# Patient Record
Sex: MALE | Race: WHITE | URBAN - METROPOLITAN AREA
[De-identification: names, ages, dates, MRNs, and addresses within clinical notes are randomized per-mention and may not be internally consistent; named-entity substitution may affect disease eponyms.]

---

## 2017-01-06 ENCOUNTER — INPATIENT (INPATIENT)
Facility: HOSPITAL | Age: 32
LOS: 1 days | Discharge: HOME | DRG: 343 | End: 2017-01-08
Attending: SURGERY | Admitting: SURGERY
Payer: COMMERCIAL

## 2017-01-06 VITALS
OXYGEN SATURATION: 100 % | TEMPERATURE: 98 F | SYSTOLIC BLOOD PRESSURE: 125 MMHG | DIASTOLIC BLOOD PRESSURE: 70 MMHG | HEART RATE: 43 BPM | RESPIRATION RATE: 17 BRPM

## 2017-01-06 LAB
APPEARANCE UR: CLEAR — SIGNIFICANT CHANGE UP
BILIRUB UR-MCNC: NEGATIVE — SIGNIFICANT CHANGE UP
COLOR SPEC: YELLOW — SIGNIFICANT CHANGE UP
DIFF PNL FLD: NEGATIVE — SIGNIFICANT CHANGE UP
GLUCOSE UR QL: NEGATIVE — SIGNIFICANT CHANGE UP
KETONES UR-MCNC: >=80 MG/DL
LEUKOCYTE ESTERASE UR-ACNC: NEGATIVE — SIGNIFICANT CHANGE UP
LIDOCAIN IGE QN: 77 U/L — SIGNIFICANT CHANGE UP (ref 73–393)
NITRITE UR-MCNC: NEGATIVE — SIGNIFICANT CHANGE UP
PH UR: 8.5 — HIGH (ref 4–8.1)
PROT UR-MCNC: (no result) MG/DL
SP GR SPEC: 1.02 — SIGNIFICANT CHANGE UP (ref 1–1.03)
UROBILINOGEN FLD QL: 0.2 E.U./DL — SIGNIFICANT CHANGE UP

## 2017-01-06 PROCEDURE — 74177 CT ABD & PELVIS W/CONTRAST: CPT | Mod: 26

## 2017-01-06 PROCEDURE — 76705 ECHO EXAM OF ABDOMEN: CPT | Mod: 26,59

## 2017-01-06 PROCEDURE — 93976 VASCULAR STUDY: CPT | Mod: 26

## 2017-01-06 PROCEDURE — 99285 EMERGENCY DEPT VISIT HI MDM: CPT

## 2017-01-06 RX ORDER — SODIUM CHLORIDE 9 MG/ML
2000 INJECTION INTRAMUSCULAR; INTRAVENOUS; SUBCUTANEOUS ONCE
Qty: 0 | Refills: 0 | Status: COMPLETED | OUTPATIENT
Start: 2017-01-06 | End: 2017-01-06

## 2017-01-06 RX ORDER — MORPHINE SULFATE 50 MG/1
4 CAPSULE, EXTENDED RELEASE ORAL ONCE
Qty: 0 | Refills: 0 | Status: DISCONTINUED | OUTPATIENT
Start: 2017-01-06 | End: 2017-01-06

## 2017-01-06 RX ORDER — ONDANSETRON 8 MG/1
4 TABLET, FILM COATED ORAL EVERY 6 HOURS
Qty: 0 | Refills: 0 | Status: DISCONTINUED | OUTPATIENT
Start: 2017-01-06 | End: 2017-01-07

## 2017-01-06 RX ORDER — PIPERACILLIN AND TAZOBACTAM 4; .5 G/20ML; G/20ML
3.38 INJECTION, POWDER, LYOPHILIZED, FOR SOLUTION INTRAVENOUS ONCE
Qty: 0 | Refills: 0 | Status: COMPLETED | OUTPATIENT
Start: 2017-01-06 | End: 2017-01-06

## 2017-01-06 RX ORDER — HYDROMORPHONE HYDROCHLORIDE 2 MG/ML
1 INJECTION INTRAMUSCULAR; INTRAVENOUS; SUBCUTANEOUS EVERY 4 HOURS
Qty: 0 | Refills: 0 | Status: DISCONTINUED | OUTPATIENT
Start: 2017-01-06 | End: 2017-01-07

## 2017-01-06 RX ORDER — HEPARIN SODIUM 5000 [USP'U]/ML
5000 INJECTION INTRAVENOUS; SUBCUTANEOUS EVERY 8 HOURS
Qty: 0 | Refills: 0 | Status: DISCONTINUED | OUTPATIENT
Start: 2017-01-06 | End: 2017-01-08

## 2017-01-06 RX ORDER — FAMOTIDINE 10 MG/ML
20 INJECTION INTRAVENOUS ONCE
Qty: 0 | Refills: 0 | Status: COMPLETED | OUTPATIENT
Start: 2017-01-06 | End: 2017-01-06

## 2017-01-06 RX ORDER — ONDANSETRON 8 MG/1
8 TABLET, FILM COATED ORAL ONCE
Qty: 0 | Refills: 0 | Status: COMPLETED | OUTPATIENT
Start: 2017-01-06 | End: 2017-01-06

## 2017-01-06 RX ORDER — SODIUM CHLORIDE 9 MG/ML
1000 INJECTION, SOLUTION INTRAVENOUS
Qty: 0 | Refills: 0 | Status: DISCONTINUED | OUTPATIENT
Start: 2017-01-06 | End: 2017-01-07

## 2017-01-06 RX ORDER — PIPERACILLIN AND TAZOBACTAM 4; .5 G/20ML; G/20ML
3.38 INJECTION, POWDER, LYOPHILIZED, FOR SOLUTION INTRAVENOUS EVERY 6 HOURS
Qty: 0 | Refills: 0 | Status: DISCONTINUED | OUTPATIENT
Start: 2017-01-06 | End: 2017-01-07

## 2017-01-06 RX ORDER — METOCLOPRAMIDE HCL 10 MG
10 TABLET ORAL ONCE
Qty: 0 | Refills: 0 | Status: COMPLETED | OUTPATIENT
Start: 2017-01-06 | End: 2017-01-06

## 2017-01-06 RX ORDER — HYDROMORPHONE HYDROCHLORIDE 2 MG/ML
0.5 INJECTION INTRAMUSCULAR; INTRAVENOUS; SUBCUTANEOUS EVERY 4 HOURS
Qty: 0 | Refills: 0 | Status: DISCONTINUED | OUTPATIENT
Start: 2017-01-06 | End: 2017-01-07

## 2017-01-06 RX ORDER — SODIUM CHLORIDE 9 MG/ML
1000 INJECTION INTRAMUSCULAR; INTRAVENOUS; SUBCUTANEOUS
Qty: 0 | Refills: 0 | Status: DISCONTINUED | OUTPATIENT
Start: 2017-01-06 | End: 2017-01-06

## 2017-01-06 RX ORDER — IOHEXOL 300 MG/ML
50 INJECTION, SOLUTION INTRAVENOUS ONCE
Qty: 0 | Refills: 0 | Status: COMPLETED | OUTPATIENT
Start: 2017-01-06 | End: 2017-01-06

## 2017-01-06 RX ADMIN — MORPHINE SULFATE 4 MILLIGRAM(S): 50 CAPSULE, EXTENDED RELEASE ORAL at 17:54

## 2017-01-06 RX ADMIN — SODIUM CHLORIDE 2000 MILLILITER(S): 9 INJECTION INTRAMUSCULAR; INTRAVENOUS; SUBCUTANEOUS at 15:54

## 2017-01-06 RX ADMIN — MORPHINE SULFATE 4 MILLIGRAM(S): 50 CAPSULE, EXTENDED RELEASE ORAL at 20:02

## 2017-01-06 RX ADMIN — MORPHINE SULFATE 4 MILLIGRAM(S): 50 CAPSULE, EXTENDED RELEASE ORAL at 16:15

## 2017-01-06 RX ADMIN — Medication 10 MILLIGRAM(S): at 19:16

## 2017-01-06 RX ADMIN — MORPHINE SULFATE 4 MILLIGRAM(S): 50 CAPSULE, EXTENDED RELEASE ORAL at 17:53

## 2017-01-06 RX ADMIN — FAMOTIDINE 20 MILLIGRAM(S): 10 INJECTION INTRAVENOUS at 16:15

## 2017-01-06 RX ADMIN — IOHEXOL 50 MILLILITER(S): 300 INJECTION, SOLUTION INTRAVENOUS at 16:38

## 2017-01-06 RX ADMIN — ONDANSETRON 8 MILLIGRAM(S): 8 TABLET, FILM COATED ORAL at 16:15

## 2017-01-06 RX ADMIN — PIPERACILLIN AND TAZOBACTAM 200 GRAM(S): 4; .5 INJECTION, POWDER, LYOPHILIZED, FOR SOLUTION INTRAVENOUS at 20:18

## 2017-01-06 RX ADMIN — MORPHINE SULFATE 4 MILLIGRAM(S): 50 CAPSULE, EXTENDED RELEASE ORAL at 20:19

## 2017-01-06 NOTE — ED PROVIDER NOTE - OBJECTIVE STATEMENT
32 yo M with no allergies, and hx of orthopedic surgeries presents with acute onset of N/V/D with abd pain today. 1 episode of diarrhea. Admits to chills. Denies fever and sweats. Pt is from [place], here for honeymoon. No sick contact. Denies generalized weakness. body aches, dizziness, and syncope. 30 yo M with no allergies, and hx of orthopedic surgeries presents with acute onset of N/V/D with abd pain today. 1 episode of diarrhea. Admits to chills. Denies fever and sweats. Pt is from [place], here for [place]. No sick contact. Denies generalized weakness. body aches, dizziness, and syncope. patient arrived from [place] 2 days ago for his honeymoon.

## 2017-01-06 NOTE — ED PROVIDER NOTE - MEDICAL DECISION MAKING DETAILS
The scribe's documentation has been prepared under my direction and personally reviewed by me in its entirety. I confirm that the note above accurately reflects all work, treatment, procedures, and medical decision making performed by me. - АННА Gandhi Pt with acute onset of abd pain and N/V. Will do labs, IV hydration and CT. re-assess.   The scribe's documentation has been prepared under my direction and personally reviewed by me in its entirety. I confirm that the note above accurately reflects all work, treatment, procedures, and medical decision making performed by me. - АННА Gandhi

## 2017-01-06 NOTE — ED PROVIDER NOTE - NS ED MD SCRIBE ATTENDING SCRIBE SECTIONS
DISPOSITION/PHYSICAL EXAM/VITAL SIGNS( Pullset)/PAST MEDICAL/SURGICAL/SOCIAL HISTORY/HIV/HISTORY OF PRESENT ILLNESS/REVIEW OF SYSTEMS

## 2017-01-07 DIAGNOSIS — Z98.890 OTHER SPECIFIED POSTPROCEDURAL STATES: Chronic | ICD-10-CM

## 2017-01-07 DIAGNOSIS — K37 UNSPECIFIED APPENDICITIS: ICD-10-CM

## 2017-01-07 LAB
ANION GAP SERPL CALC-SCNC: 8 MMOL/L — LOW (ref 9–16)
BLD GP AB SCN SERPL QL: NEGATIVE — SIGNIFICANT CHANGE UP
BLD GP AB SCN SERPL QL: NEGATIVE — SIGNIFICANT CHANGE UP
BUN SERPL-MCNC: 16 MG/DL — SIGNIFICANT CHANGE UP (ref 7–23)
CALCIUM SERPL-MCNC: 8.4 MG/DL — LOW (ref 8.5–10.5)
CHLORIDE SERPL-SCNC: 102 MMOL/L — SIGNIFICANT CHANGE UP (ref 96–108)
CO2 SERPL-SCNC: 27 MMOL/L — SIGNIFICANT CHANGE UP (ref 22–31)
CREAT SERPL-MCNC: 1.22 MG/DL — SIGNIFICANT CHANGE UP (ref 0.5–1.3)
GLUCOSE SERPL-MCNC: 83 MG/DL — SIGNIFICANT CHANGE UP (ref 70–99)
HCT VFR BLD CALC: 38.7 % — LOW (ref 39–50)
HGB BLD-MCNC: 13.4 G/DL — SIGNIFICANT CHANGE UP (ref 13–17)
MAGNESIUM SERPL-MCNC: 2.1 MG/DL — SIGNIFICANT CHANGE UP (ref 1.6–2.4)
MCHC RBC-ENTMCNC: 29.5 PG — SIGNIFICANT CHANGE UP (ref 27–34)
MCHC RBC-ENTMCNC: 34.6 G/DL — SIGNIFICANT CHANGE UP (ref 32–36)
MCV RBC AUTO: 85.1 FL — SIGNIFICANT CHANGE UP (ref 80–100)
PHOSPHATE SERPL-MCNC: 2.9 MG/DL — SIGNIFICANT CHANGE UP (ref 2.5–4.5)
PLATELET # BLD AUTO: 175 K/UL — SIGNIFICANT CHANGE UP (ref 150–400)
POTASSIUM SERPL-MCNC: 3.7 MMOL/L — SIGNIFICANT CHANGE UP (ref 3.5–5.3)
POTASSIUM SERPL-SCNC: 3.7 MMOL/L — SIGNIFICANT CHANGE UP (ref 3.5–5.3)
RBC # BLD: 4.55 M/UL — SIGNIFICANT CHANGE UP (ref 4.2–5.8)
RBC # FLD: 11.2 % — SIGNIFICANT CHANGE UP (ref 10.3–16.9)
RH IG SCN BLD-IMP: POSITIVE — SIGNIFICANT CHANGE UP
RH IG SCN BLD-IMP: POSITIVE — SIGNIFICANT CHANGE UP
SODIUM SERPL-SCNC: 137 MMOL/L — SIGNIFICANT CHANGE UP (ref 135–145)
WBC # BLD: 10 K/UL — SIGNIFICANT CHANGE UP (ref 3.8–10.5)
WBC # FLD AUTO: 10 K/UL — SIGNIFICANT CHANGE UP (ref 3.8–10.5)

## 2017-01-07 RX ORDER — PIPERACILLIN AND TAZOBACTAM 4; .5 G/20ML; G/20ML
3.38 INJECTION, POWDER, LYOPHILIZED, FOR SOLUTION INTRAVENOUS EVERY 6 HOURS
Qty: 0 | Refills: 0 | Status: DISCONTINUED | OUTPATIENT
Start: 2017-01-07 | End: 2017-01-07

## 2017-01-07 RX ORDER — HYDROMORPHONE HYDROCHLORIDE 2 MG/ML
1 INJECTION INTRAMUSCULAR; INTRAVENOUS; SUBCUTANEOUS
Qty: 0 | Refills: 0 | Status: DISCONTINUED | OUTPATIENT
Start: 2017-01-07 | End: 2017-01-08

## 2017-01-07 RX ORDER — ONDANSETRON 8 MG/1
4 TABLET, FILM COATED ORAL EVERY 6 HOURS
Qty: 0 | Refills: 0 | Status: DISCONTINUED | OUTPATIENT
Start: 2017-01-07 | End: 2017-01-08

## 2017-01-07 RX ORDER — SODIUM CHLORIDE 9 MG/ML
1000 INJECTION, SOLUTION INTRAVENOUS
Qty: 0 | Refills: 0 | Status: DISCONTINUED | OUTPATIENT
Start: 2017-01-07 | End: 2017-01-08

## 2017-01-07 RX ORDER — PIPERACILLIN AND TAZOBACTAM 4; .5 G/20ML; G/20ML
3.38 INJECTION, POWDER, LYOPHILIZED, FOR SOLUTION INTRAVENOUS EVERY 8 HOURS
Qty: 0 | Refills: 0 | Status: DISCONTINUED | OUTPATIENT
Start: 2017-01-07 | End: 2017-01-08

## 2017-01-07 RX ORDER — HYDROMORPHONE HYDROCHLORIDE 2 MG/ML
0.5 INJECTION INTRAMUSCULAR; INTRAVENOUS; SUBCUTANEOUS ONCE
Qty: 0 | Refills: 0 | Status: DISCONTINUED | OUTPATIENT
Start: 2017-01-07 | End: 2017-01-08

## 2017-01-07 RX ORDER — HYDROMORPHONE HYDROCHLORIDE 2 MG/ML
1 INJECTION INTRAMUSCULAR; INTRAVENOUS; SUBCUTANEOUS
Qty: 0 | Refills: 0 | Status: DISCONTINUED | OUTPATIENT
Start: 2017-01-07 | End: 2017-01-07

## 2017-01-07 RX ADMIN — HYDROMORPHONE HYDROCHLORIDE 1 MILLIGRAM(S): 2 INJECTION INTRAMUSCULAR; INTRAVENOUS; SUBCUTANEOUS at 12:29

## 2017-01-07 RX ADMIN — ONDANSETRON 4 MILLIGRAM(S): 8 TABLET, FILM COATED ORAL at 21:10

## 2017-01-07 RX ADMIN — HEPARIN SODIUM 5000 UNIT(S): 5000 INJECTION INTRAVENOUS; SUBCUTANEOUS at 14:18

## 2017-01-07 RX ADMIN — PIPERACILLIN AND TAZOBACTAM 200 GRAM(S): 4; .5 INJECTION, POWDER, LYOPHILIZED, FOR SOLUTION INTRAVENOUS at 14:18

## 2017-01-07 RX ADMIN — PIPERACILLIN AND TAZOBACTAM 200 GRAM(S): 4; .5 INJECTION, POWDER, LYOPHILIZED, FOR SOLUTION INTRAVENOUS at 21:43

## 2017-01-07 RX ADMIN — PIPERACILLIN AND TAZOBACTAM 200 GRAM(S): 4; .5 INJECTION, POWDER, LYOPHILIZED, FOR SOLUTION INTRAVENOUS at 05:26

## 2017-01-07 RX ADMIN — HEPARIN SODIUM 5000 UNIT(S): 5000 INJECTION INTRAVENOUS; SUBCUTANEOUS at 21:10

## 2017-01-07 RX ADMIN — PIPERACILLIN AND TAZOBACTAM 200 GRAM(S): 4; .5 INJECTION, POWDER, LYOPHILIZED, FOR SOLUTION INTRAVENOUS at 00:09

## 2017-01-07 NOTE — DISCHARGE NOTE ADULT - MEDICATION SUMMARY - MEDICATIONS TO TAKE
I will START or STAY ON the medications listed below when I get home from the hospital:    acetaminophen-oxyCODONE 325 mg-5 mg oral tablet  -- 1 tab(s) by mouth every 4 hours, As needed, Moderate Pain (4 - 6) MDD:6  -- Indication: For pain I will START or STAY ON the medications listed below when I get home from the hospital:    acetaminophen-oxyCODONE 325 mg-5 mg oral tablet  -- 1 tab(s) by mouth every 4 hours, As needed, Moderate Pain (4 - 6) MDD:6  -- Indication: For pain    Colace sodium 100 mg oral capsule  -- 1 cap(s) by mouth 2 times a day  -- Medication should be taken with plenty of water.    -- Indication: For stool softener

## 2017-01-07 NOTE — PROGRESS NOTE ADULT - SUBJECTIVE AND OBJECTIVE BOX
PRE OPERATIVE NOTE    Pre-op Diagnosis: acute appendicitis  Procedure: laparoscopic appendectomy   Surgeon: Dr. Bishop    Consent in chart                          15.5   16.8  )-----------( 218      ( 2017 15:52 )             43.0     2017 15:52    136    |  99     |  24     ----------------------------<  117    3.6     |  26     |  1.44     Ca    10.2       2017 15:52    TPro  7.9    /  Alb  4.3    /  TBili  0.9    /  DBili  x      /  AST  22     /  ALT  15     /  AlkPhos  71     2017 15:52      Urinalysis Basic - ( 2017 18:36 )    Color: Yellow / Appearance: Clear / S.020 / pH: x  Gluc: x / Ketone: >=80 mg/dL  / Bili: NEGATIVE / Urobili: 0.2 E.U./dL   Blood: x / Protein: Trace mg/dL / Nitrite: NEGATIVE   Leuk Esterase: NEGATIVE / RBC: < 5 /HPF / WBC < 5 /HPF   Sq Epi: x / Non Sq Epi: Rare /HPF / Bacteria: x        Type & Screen: done PRE OPERATIVE NOTE    Pre-op Diagnosis: acute appendicitis  Procedure: laparoscopic appendectomy   Surgeon: Dr. Bishop    Consent in chart    Pt. seen and examined at beside. c/o tenderness RLQ.     PE:    NAD  RRR s mg/r/ +S1/S2  CTA B  TTP RLQ (-) rebound, (-) guarding, (+) BS  2+ pulses distally                          15.5   16.8  )-----------( 218      ( 2017 15:52 )             43.0     2017 15:52    136    |  99     |  24     ----------------------------<  117    3.6     |  26     |  1.44     Ca    10.2       2017 15:52    TPro  7.9    /  Alb  4.3    /  TBili  0.9    /  DBili  x      /  AST  22     /  ALT  15     /  AlkPhos  71     2017 15:52      Urinalysis Basic - ( 2017 18:36 )    Color: Yellow / Appearance: Clear / S.020 / pH: x  Gluc: x / Ketone: >=80 mg/dL  / Bili: NEGATIVE / Urobili: 0.2 E.U./dL   Blood: x / Protein: Trace mg/dL / Nitrite: NEGATIVE   Leuk Esterase: NEGATIVE / RBC: < 5 /HPF / WBC < 5 /HPF   Sq Epi: x / Non Sq Epi: Rare /HPF / Bacteria: x        Type & Screen: done

## 2017-01-07 NOTE — DISCHARGE NOTE ADULT - ADDITIONAL INSTRUCTIONS
Please follow-up with Dr. Bishop in 1 week.   Call [phone] to make an appointment. Resume regular activity as tolerated.  No strenuous activity or heavy lifting >15 pounds until cleared by Dr. Bishop.  Resume regular diet.  May shower, but no baths or soaking for 2 weeks.  Return to ER with Fever >101, bleeding/drainage from incisions, or severe abdominal pain not relieved by pain medication.

## 2017-01-07 NOTE — DISCHARGE NOTE ADULT - HOSPITAL COURSE
30 yo otherwise healthy male presented to [facility] with periumbilical abdominal pain since morning of presentation. Associated with nausea and NB vomiting. Denies fever, chills, SOB or changes in bowel habits. CT A/P consistent with acute appendicitis. No previous abdominal surgeries. Non-smoker and social drinker. Patient currently on his honeymoon and had originally planned to return to [place] on 1/8. Patient was subsequently taken to the operating room where he underwent a laparoscopic appendectomy. He tolerated the procedure well without complication. His post-operative course was unremarkable. At the time of discharge, he was tolerating a regular diet, his pain was well controlled with oral pain medications, and he was ambulating independently. He was discharged home with instructions to follow up with Dr. Bishop in 1 week.

## 2017-01-07 NOTE — DISCHARGE NOTE ADULT - CARE PROVIDER_API CALL
Kobe Bishop), Surgery  [address] [place] [address]  Phone: [phone]  Fax: [phone]

## 2017-01-07 NOTE — DISCHARGE NOTE ADULT - INSTRUCTIONS
Please follow-up with Dr. Bishop in 1 week.   Call [phone] to make an appointment. Resume regular activity as tolerated.  No strenuous activity or heavy lifting >15 pounds until cleared by Dr. Bishop.  Resume regular diet.  May shower, but no baths or soaking for 2 weeks.  Return to ER with Fever >101, bleeding/drainage from incisions, or severe abdominal pain not relieved by pain medication.

## 2017-01-07 NOTE — PROGRESS NOTE ADULT - ASSESSMENT
A/P: 31yMale planned for above procedure    1. NPO past midnight, except medications  2. IVF lactated ringers. 1000milliLiter(s) IV Continuous <Continuous>    3. [ ] Blood on hold, Units: A/P: 31yMale planned for above procedure    1. NPO past midnight, except medications  2. IVF   3. Zosyn  4. Pain/nausea control  5. DVT ppx  6. OR for laparoscopic appendectomy

## 2017-01-07 NOTE — H&P ADULT. - HISTORY OF PRESENT ILLNESS
30 yo otherwise healthy male presented to [facility] with periumbilical abdominal pain since morning of presentation. Associated with nausea and NB vomiting. Denies fever, chills, SOB or changes in bowel habits. CT A/P consistent with acute appendicitis. No previous abdominal surgeries. Non-smoker and social drinker. Patient currently on his honeymoon and had originally planned to return to [place] on 1/8.

## 2017-01-07 NOTE — DISCHARGE NOTE ADULT - PLAN OF CARE
s/p laparoscopic appendectomy Please follow-up with Dr. Bishop in 1 week.   Call [phone] to make an appointment. Resume regular activity as tolerated.  No strenuous activity or heavy lifting >15 pounds until cleared by Dr. Bishop.  Resume regular diet.  May shower, but no baths or soaking for 2 weeks.  Return to ER with Fever >101, bleeding/drainage from incisions, or severe abdominal pain not relieved by pain medication.

## 2017-01-07 NOTE — DISCHARGE NOTE ADULT - PATIENT PORTAL LINK FT
“You can access the [facility] Patient Portal, offered by [facility], by registering with the following website: [email or web address]”

## 2017-01-07 NOTE — PROGRESS NOTE ADULT - SUBJECTIVE AND OBJECTIVE BOX
KWESI MILLER  [id number]  31y  Male  No Known Allergies      T(C): 37.1, Max: 37.3 (01-06 @ 16:51)  HR: 52 (42 - 64)  BP: 137/75 (105/55 - 148/82)  RR: 16 (16 - 18)  SpO2: 100% (98% - 100%)  Wt(kg): --    Pt. seen and examined in PACU. Resting comfortably. Pain well controlled.     GEN: NAD  CV: RRR s/ m/g/r, +S1/S2  PULM: CTA B  ABD: S, mild distenstion, ATTP at incision sites, incisions with dermabond C/D/I  EXT: no edema KWESI MILLER  [id number]  31y  Male  No Known Allergies      T(C): 37.1, Max: 37.3 (01-06 @ 16:51)  HR: 52 (42 - 64)  BP: 137/75 (105/55 - 148/82)  RR: 16 (16 - 18)  SpO2: 100% (98% - 100%)  Wt(kg): --    POST OP CHECK    Pt. seen and examined in PACU. Resting comfortably. Pain well controlled.     GEN: NAD  CV: RRR s/ m/g/r, +S1/S2  PULM: CTA B  ABD: S, mild distenstion, ATTP at incision sites, incisions with dermabond C/D/I  EXT: no edema

## 2017-01-07 NOTE — DISCHARGE NOTE ADULT - CARE PLAN
Principal Discharge DX:	Appendicitis  Goal:	s/p laparoscopic appendectomy  Instructions for follow-up, activity and diet:	Please follow-up with Dr. Bishop in 1 week.   Call [phone] to make an appointment. Resume regular activity as tolerated.  No strenuous activity or heavy lifting >15 pounds until cleared by Dr. Bishop.  Resume regular diet.  May shower, but no baths or soaking for 2 weeks.  Return to ER with Fever >101, bleeding/drainage from incisions, or severe abdominal pain not relieved by pain medication. Principal Discharge DX:	Appendicitis  Goal:	s/p laparoscopic appendectomy  Instructions for follow-up, activity and diet:	Please follow-up with Dr. Bishop in 1 week.   Call [phone] to make an appointment. Resume regular activity as tolerated.  No strenuous activity or heavy lifting >15 pounds until cleared by Dr. Bishop.  Resume regular diet.  May shower, but no baths or soaking for 2 weeks.  Return to ER with Fever >101, bleeding/drainage from incisions, or severe abdominal pain not relieved by pain medication. Principal Discharge DX:	Appendicitis  Goal:	s/p laparoscopic appendectomy  Instructions for follow-up, activity and diet:	Please follow-up with Dr. Bishop in 1 week.   Call [phone] to make an appointment. Resume regular activity as tolerated.  No strenuous activity or heavy lifting >15 pounds until cleared by Dr. Bishop.  Resume regular diet.  May shower, but no baths or soaking for 2 weeks.  Return to ER with Fever >101, bleeding/drainage from incisions, or severe abdominal pain not relieved by pain medication. Principal Discharge DX:	Appendicitis  Goal:	s/p laparoscopic appendectomy  Instructions for follow-up, activity and diet:	Please follow-up with Dr. Bishop in 1 week.   Call [phone] to make an appointment. Resume regular activity as tolerated.  No strenuous activity or heavy lifting >15 pounds until cleared by Dr. Bishop.  Resume regular diet.  May shower, but no baths or soaking for 2 weeks.  Return to ER with Fever >101, bleeding/drainage from incisions, or severe abdominal pain not relieved by pain medication. Principal Discharge DX:	Appendicitis  Goal:	s/p laparoscopic appendectomy  Instructions for follow-up, activity and diet:	Please follow-up with Dr. Bishop in 1 week.   Call [phone] to make an appointment. Resume regular activity as tolerated.  No strenuous activity or heavy lifting >15 pounds until cleared by Dr. Bishop.  Resume regular diet.  May shower, but no baths or soaking for 2 weeks.  Return to ER with Fever >101, bleeding/drainage from incisions, or severe abdominal pain not relieved by pain medication.

## 2017-01-08 VITALS
TEMPERATURE: 98 F | SYSTOLIC BLOOD PRESSURE: 110 MMHG | RESPIRATION RATE: 16 BRPM | DIASTOLIC BLOOD PRESSURE: 61 MMHG | OXYGEN SATURATION: 97 % | HEART RATE: 50 BPM

## 2017-01-08 RX ORDER — DOCUSATE SODIUM 100 MG
1 CAPSULE ORAL
Qty: 14 | Refills: 0 | OUTPATIENT
Start: 2017-01-08 | End: 2017-01-15

## 2017-01-08 RX ORDER — OXYCODONE HYDROCHLORIDE 5 MG/1
1 TABLET ORAL
Qty: 20 | Refills: 0 | OUTPATIENT
Start: 2017-01-08

## 2017-01-08 RX ADMIN — HEPARIN SODIUM 5000 UNIT(S): 5000 INJECTION INTRAVENOUS; SUBCUTANEOUS at 05:07

## 2017-01-08 RX ADMIN — PIPERACILLIN AND TAZOBACTAM 200 GRAM(S): 4; .5 INJECTION, POWDER, LYOPHILIZED, FOR SOLUTION INTRAVENOUS at 05:07

## 2017-01-08 NOTE — PROGRESS NOTE ADULT - SUBJECTIVE AND OBJECTIVE BOX
STATUS POST:   laparoscopic appendectomy POD1     SUBJECTIVE: Overnight, ARIC.    heparin  Injectable 5000Unit(s) SubCutaneous every 8 hours  piperacillin/tazobactam IVPB. 3.375Gram(s) IV Intermittent every 8 hours      Vital Signs Last 24 Hrs  T(C): 36.5, Max: 37.1 ( @ 05:25)  T(F): 97.7, Max: 98.8 ( @ 11:20)  HR: 48 (44 - 64)  BP: 111/66 (100/58 - 137/75)  BP(mean): --  RR: 15 (15 - 18)  SpO2: 97% (97% - 100%)  I&O's Detail      General: NAD, resting comfortably in bed  C/V: NSR  Pulm: Nonlabored breathing, no respiratory distress  Abd: soft, NT/ND. Incisions CDI.  Extrem: WWP, no edema, SCDs in place        LABS:                        13.4   10.0  )-----------( 175      ( 2017 08:22 )             38.7     2017 08:22    137    |  102    |  16     ----------------------------<  83     3.7     |  27     |  1.22     Ca    8.4        2017 08:22  Phos  2.9       2017 08:22  Mg     2.1       2017 08:22    TPro  7.9    /  Alb  4.3    /  TBili  0.9    /  DBili  x      /  AST  22     /  ALT  15     /  AlkPhos  71     2017 15:52      Urinalysis Basic - ( 2017 18:36 )    Color: Yellow / Appearance: Clear / S.020 / pH: x  Gluc: x / Ketone: >=80 mg/dL  / Bili: NEGATIVE / Urobili: 0.2 E.U./dL   Blood: x / Protein: Trace mg/dL / Nitrite: NEGATIVE   Leuk Esterase: NEGATIVE / RBC: < 5 /HPF / WBC < 5 /HPF   Sq Epi: x / Non Sq Epi: Rare /HPF / Bacteria: x        RADIOLOGY & ADDITIONAL STUDIES:

## 2017-01-08 NOTE — PROGRESS NOTE ADULT - ASSESSMENT
31yoM with no PMH admitted with acute appendicitis s/p laparoscopic appendectomy POD1  Pain/nausea control  CLD, advance to RD as tolerated  Zosyn  LR@150  SQH/SCDs/OOBA  Possible d/c home today 31yoM with no PMH admitted with acute appendicitis s/p laparoscopic appendectomy POD1  Pain/nausea control  CLD, advance to RD as tolerated  Zosyn  LR@150  SQH/SCDs/OOBA   d/c home today

## 2017-01-09 PROCEDURE — 96376 TX/PRO/DX INJ SAME DRUG ADON: CPT

## 2017-01-09 PROCEDURE — 88304 TISSUE EXAM BY PATHOLOGIST: CPT

## 2017-01-09 PROCEDURE — 80053 COMPREHEN METABOLIC PANEL: CPT

## 2017-01-09 PROCEDURE — 86850 RBC ANTIBODY SCREEN: CPT

## 2017-01-09 PROCEDURE — 81001 URINALYSIS AUTO W/SCOPE: CPT

## 2017-01-09 PROCEDURE — 74177 CT ABD & PELVIS W/CONTRAST: CPT

## 2017-01-09 PROCEDURE — 96375 TX/PRO/DX INJ NEW DRUG ADDON: CPT | Mod: XU

## 2017-01-09 PROCEDURE — 81003 URINALYSIS AUTO W/O SCOPE: CPT

## 2017-01-09 PROCEDURE — 86900 BLOOD TYPING SEROLOGIC ABO: CPT

## 2017-01-09 PROCEDURE — 36415 COLL VENOUS BLD VENIPUNCTURE: CPT

## 2017-01-09 PROCEDURE — 85027 COMPLETE CBC AUTOMATED: CPT

## 2017-01-09 PROCEDURE — 85025 COMPLETE CBC W/AUTO DIFF WBC: CPT

## 2017-01-09 PROCEDURE — 84100 ASSAY OF PHOSPHORUS: CPT

## 2017-01-09 PROCEDURE — 83735 ASSAY OF MAGNESIUM: CPT

## 2017-01-09 PROCEDURE — 86901 BLOOD TYPING SEROLOGIC RH(D): CPT

## 2017-01-09 PROCEDURE — 83690 ASSAY OF LIPASE: CPT

## 2017-01-09 PROCEDURE — 99285 EMERGENCY DEPT VISIT HI MDM: CPT | Mod: 25

## 2017-01-09 PROCEDURE — 76705 ECHO EXAM OF ABDOMEN: CPT

## 2017-01-09 PROCEDURE — 80048 BASIC METABOLIC PNL TOTAL CA: CPT

## 2017-01-09 PROCEDURE — 96374 THER/PROPH/DIAG INJ IV PUSH: CPT | Mod: XU

## 2017-01-12 DIAGNOSIS — K35.80 UNSPECIFIED ACUTE APPENDICITIS: ICD-10-CM

## 2017-01-12 DIAGNOSIS — R11.2 NAUSEA WITH VOMITING, UNSPECIFIED: ICD-10-CM

## 2017-01-17 LAB — SURGICAL PATHOLOGY STUDY: SIGNIFICANT CHANGE UP

## 2021-07-13 NOTE — PATIENT PROFILE ADULT. - CURRENT SWALLOWING
(0) swallows foods and liquids w/o difficulty Colchicine Counseling:  Patient counseled regarding adverse effects including but not limited to stomach upset (nausea, vomiting, stomach pain, or diarrhea).  Patient instructed to limit alcohol consumption while taking this medication.  Colchicine may reduce blood counts especially with prolonged use.  The patient understands that monitoring of kidney function and blood counts may be required, especially at baseline. The patient verbalized understanding of the proper use and possible adverse effects of colchicine.  All of the patient's questions and concerns were addressed.
